# Patient Record
Sex: MALE | Race: WHITE
[De-identification: names, ages, dates, MRNs, and addresses within clinical notes are randomized per-mention and may not be internally consistent; named-entity substitution may affect disease eponyms.]

---

## 2019-01-28 ENCOUNTER — HOSPITAL ENCOUNTER (EMERGENCY)
Dept: HOSPITAL 62 - ER | Age: 29
Discharge: HOME | End: 2019-01-28
Payer: SELF-PAY

## 2019-01-28 VITALS — DIASTOLIC BLOOD PRESSURE: 93 MMHG | SYSTOLIC BLOOD PRESSURE: 139 MMHG

## 2019-01-28 DIAGNOSIS — Z53.20: ICD-10-CM

## 2019-01-28 DIAGNOSIS — Z72.89: ICD-10-CM

## 2019-01-28 DIAGNOSIS — L02.414: Primary | ICD-10-CM

## 2019-01-28 DIAGNOSIS — F19.10: ICD-10-CM

## 2019-01-28 DIAGNOSIS — F17.200: ICD-10-CM

## 2019-01-28 LAB
ADD MANUAL DIFF: NO
ALBUMIN SERPL-MCNC: 4.8 G/DL (ref 3.5–5)
ALP SERPL-CCNC: 81 U/L (ref 38–126)
ALT SERPL-CCNC: 53 U/L (ref 21–72)
ANION GAP SERPL CALC-SCNC: 10 MMOL/L (ref 5–19)
AST SERPL-CCNC: 31 U/L (ref 17–59)
BASOPHILS # BLD AUTO: 0.1 10^3/UL (ref 0–0.2)
BASOPHILS NFR BLD AUTO: 0.7 % (ref 0–2)
BILIRUB DIRECT SERPL-MCNC: 0.3 MG/DL (ref 0–0.4)
BILIRUB SERPL-MCNC: 0.3 MG/DL (ref 0.2–1.3)
BUN SERPL-MCNC: 11 MG/DL (ref 7–20)
CALCIUM: 9.5 MG/DL (ref 8.4–10.2)
CHLORIDE SERPL-SCNC: 102 MMOL/L (ref 98–107)
CO2 SERPL-SCNC: 27 MMOL/L (ref 22–30)
EOSINOPHIL # BLD AUTO: 0.5 10^3/UL (ref 0–0.6)
EOSINOPHIL NFR BLD AUTO: 3.2 % (ref 0–6)
ERYTHROCYTE [DISTWIDTH] IN BLOOD BY AUTOMATED COUNT: 13.5 % (ref 11.5–14)
ETHANOL SERPL-MCNC: < 10 MG/DL
GLUCOSE SERPL-MCNC: 92 MG/DL (ref 75–110)
HCT VFR BLD CALC: 45.7 % (ref 37.9–51)
HGB BLD-MCNC: 15.7 G/DL (ref 13.5–17)
LYMPHOCYTES # BLD AUTO: 2.1 10^3/UL (ref 0.5–4.7)
LYMPHOCYTES NFR BLD AUTO: 14.1 % (ref 13–45)
MCH RBC QN AUTO: 30.5 PG (ref 27–33.4)
MCHC RBC AUTO-ENTMCNC: 34.3 G/DL (ref 32–36)
MCV RBC AUTO: 89 FL (ref 80–97)
MONOCYTES # BLD AUTO: 1.1 10^3/UL (ref 0.1–1.4)
MONOCYTES NFR BLD AUTO: 7.4 % (ref 3–13)
NEUTROPHILS # BLD AUTO: 11.4 10^3/UL (ref 1.7–8.2)
NEUTS SEG NFR BLD AUTO: 74.6 % (ref 42–78)
PLATELET # BLD: 267 10^3/UL (ref 150–450)
POTASSIUM SERPL-SCNC: 5 MMOL/L (ref 3.6–5)
PROT SERPL-MCNC: 8 G/DL (ref 6.3–8.2)
RBC # BLD AUTO: 5.14 10^6/UL (ref 4.35–5.55)
SODIUM SERPL-SCNC: 139.2 MMOL/L (ref 137–145)
TOTAL CELLS COUNTED % (AUTO): 100 %
WBC # BLD AUTO: 15.2 10^3/UL (ref 4–10.5)

## 2019-01-28 PROCEDURE — 80053 COMPREHEN METABOLIC PANEL: CPT

## 2019-01-28 PROCEDURE — 83735 ASSAY OF MAGNESIUM: CPT

## 2019-01-28 PROCEDURE — 80307 DRUG TEST PRSMV CHEM ANLYZR: CPT

## 2019-01-28 PROCEDURE — 99281 EMR DPT VST MAYX REQ PHY/QHP: CPT

## 2019-01-28 PROCEDURE — 36415 COLL VENOUS BLD VENIPUNCTURE: CPT

## 2019-01-28 PROCEDURE — 85025 COMPLETE CBC W/AUTO DIFF WBC: CPT

## 2019-01-28 NOTE — ER DOCUMENT REPORT
ED Medical Screen (RME)





- General


Chief Complaint: Alcohol Withdrawl


Stated Complaint: POSSIBLE ETOH


Time Seen by Provider: 01/28/19 11:45


Notes: 





28-year-old male patient comes emergency room for abscess on his left proximal 

forearm.  Is been going on for about a week.  There appear to be some satellite 

lesions.  He states he has had these before, but they always cleared with 

antibiotics and did not require incision and drainage.  He also states that he 

has not done IV drugs in a long time.  He states he drinks a case of beer a day,

last alcohol was 11:00 PM last night and he states he is in alcohol withdrawal. 

He also states he wants to stop drinking.











I have greeted and performed a rapid initial assessment of this patient.  A 

comprehensive ED assessment and evaluation of the patient, analysis of test 

results and completion of the medical decision making process will be conducted 

by additional ED providers.


TRAVEL OUTSIDE OF THE [place] IN LAST 30 DAYS: No





- Related Data


Allergies/Adverse Reactions: 


                                        





No Known Allergies Allergy (Verified 01/28/19 11:20)


   











Physical Exam





- Vital signs


Vitals: 





                                        











Temp Pulse Resp BP Pulse Ox


 


 97.4 F   101 H  20   139/93 H  100 


 


 01/28/19 11:23  01/28/19 11:23  01/28/19 11:23  01/28/19 11:23  01/28/19 11:23














Course





- Vital Signs


Vital signs: 





                                        











Temp Pulse Resp BP Pulse Ox


 


 97.4 F   101 H  20   139/93 H  100 


 


 01/28/19 11:23  01/28/19 11:23  01/28/19 11:23  01/28/19 11:23  01/28/19 11:23

## 2019-01-28 NOTE — ER DOCUMENT REPORT
ED General





- General


Chief Complaint: Alcohol Withdrawl


Stated Complaint: POSSIBLE ETOH


Time Seen by Provider: 01/28/19 11:45


TRAVEL OUTSIDE OF THE [place] IN LAST 30 DAYS: No





- HPI


Patient complains to provider of: Alcohol withdrawals abscess left arm


Notes: 





Patient coming in for evaluation of the above state complaints.  Patient was 

seen in triage notes provided below





28-year-old male patient comes emergency room for abscess on his left proximal 

forearm.  Is been going on for about a week.  There appear to be some satellite 

lesions.  He states he has had these before, but they always cleared with 

antibiotics and did not require incision and drainage.  He also states that he 

has not done IV drugs in a long time.  He states he drinks a case of beer a day,

last alcohol was 11:00 PM last night and he states he is in alcohol withdrawal. 

He also states he wants to stop drinking.








Upon my evaluation patient is resting comfortably in the stretcher.  Patient is 

requesting something for his alcohol withdrawals patient looks to be in no 

obvious distress asked the patient if he would like to go through detox and stop

drinking patient states no.  Explained to the patient that we will move on to 

further evaluate his abscess patient has an abscess lateral portion adjacent to 

the antecubital region on the left arm.  Patient's arms heavily tattooed this is

at the point of the tattoo patient denies any IV drug use denies fevers chills 

nausea vomiting diarrhea recent antibiotics.





- Related Data


Allergies/Adverse Reactions: 


                                        





No Known Allergies Allergy (Verified 01/28/19 11:20)


   











Past Medical History





- Social History


Smoking Status: Current Every Day Smoker


Chew tobacco use (# tins/day): No


Frequency of alcohol use: Heavy


Drug Abuse: Other


Family History: Reviewed & Not Pertinent


Patient has suicidal ideation: No


Patient has homicidal ideation: No


Renal/ Medical History: Denies: Hx Peritoneal Dialysis





Review of Systems





- Review of Systems


Constitutional: No symptoms reported


EENT: No symptoms reported


Cardiovascular: No symptoms reported


Respiratory: No symptoms reported


Gastrointestinal: No symptoms reported


Genitourinary: No symptoms reported


Male Genitourinary: No symptoms reported


Musculoskeletal: Other - Abscess


Skin: No symptoms reported


Hematologic/Lymphatic: No symptoms reported


Neurological/Psychological: No symptoms reported


-: Yes All other systems reviewed and negative





Physical Exam





- Vital signs


Vitals: 


                                        











Temp Pulse Resp BP Pulse Ox


 


 97.4 F   101 H  20   139/93 H  100 


 


 01/28/19 11:23  01/28/19 11:23  01/28/19 11:23  01/28/19 11:23  01/28/19 11:23











Interpretation: Normal





- General


General appearance: Appears well, Alert





- HEENT


Head: Normocephalic, Atraumatic


Eyes: Normal


Pupils: PERRL





- Respiratory


Respiratory status: No respiratory distress


Chest status: Nontender


Breath sounds: Normal


Chest palpation: Normal





- Cardiovascular


Rhythm: Regular


Heart sounds: Normal auscultation


Murmur: No





- Abdominal


Inspection: Normal


Distension: No distension


Bowel sounds: Normal


Tenderness: Nontender


Organomegaly: No organomegaly





- Back


Back: Normal, Nontender





- Extremities


General upper extremity: Nontender, Normal color, Normal ROM, Normal 

temperature.  No: Normal inspection - Heavily tattooed gentleman with an abscess

approximately 3 cm x 3 cm lecture once left arm and antecubital region lateral 

side patient also has small areas of erythema to the forearm with no signs of 

abscess there is no streaking no cellulitic component


General lower extremity: Normal inspection, Nontender, Normal color, Normal ROM,

Normal temperature, Normal weight bearing.  No: Claire's sign





- Neurological


Neuro grossly intact: Yes


Cognition: Normal


Orientation: AAOx4


[place] Coma Scale Eye Opening: Spontaneous


[place] Coma Scale Verbal: Oriented


[place] Coma Scale Motor: Obeys Commands


[place] Coma Scale Total: 15


Speech: Normal


Motor strength normal: LUE, RUE, LLE, RLE


Sensory: Normal





- Psychological


Associated symptoms: Normal affect, Normal mood





- Skin


Skin Temperature: Warm


Skin Moisture: Dry


Skin Color: Normal





Course





- Re-evaluation


Re-evalutation: 





01/28/19 15:46


Explained the patient has time he does not want to quit drinking he does not 

look to be in acute alcohol withdrawals that we would drain his abscess.  Upon 

obtaining the ultrasound and the I&D kit upon entrance into the patient's room 

the patient had eloped.





- Vital Signs


Vital signs: 


                                        











Temp Pulse Resp BP Pulse Ox


 


 97.4 F   101 H  16   139/93 H  100 


 


 01/28/19 11:23  01/28/19 11:23  01/28/19 11:41  01/28/19 11:23  01/28/19 11:23














- Laboratory


Result Diagrams: 


                                 01/28/19 12:16





                                 01/28/19 12:16


Laboratory results interpreted by me: 


                                        











  01/28/19





  12:16


 


WBC  15.2 H


 


Absolute Neutrophils  11.4 H














Discharge





- Discharge


Clinical Impression: 


 Abscess of left arm, Alcohol use





Condition: Good


Disposition: HOME, SELF-CARE


Instructions:  Abscess ([facility] / [facility]), Trimethoprim-Sulfa ([facility] / [facility])


Additional Instructions: 


Please take your antibiotics as prescribed return to the ER symptoms worsen 

continue to drink alcohol.  If you decide to undergo detox you may return to the

ER or follow-up with the resources provided


Prescriptions: 


Sulfamethoxazole/Trimethoprim [Bactrim Ds Tablet] 1 each PO BID #20 tablet